# Patient Record
(demographics unavailable — no encounter records)

---

## 2024-10-30 NOTE — RESULTS/DATA
[FreeTextEntry1] : Follow-up PET/CT scan shows no evidence of disease:  7/18/23 PET/CT: IMPRESSION: 1. Persistent focal activity within the rectum that appears to correlate to a stable soft tissue density may represent activity in a polyp. Correlation with proctosigmoidoscopy is recommended.  2. New elongated subpleural opacity in the left lower lobe with low-grade activity may reflect an inflammatory process; reassess on follow-up.  3. Right upper lobe post radiation changes with similar nonfocal activity; other findings in the right middle lobe and in the lingula with nonspecific low grade activity likely reflective of nonspecific inflammatory process.   4/6/23 PET/CT IMPRESSION: 1. Relatively stable right upper lobe groundglass opacity with nonspecific activity and no distinct nodular component. Other lung findings remain stable without abnormal activity 2. Stable findings in the head and neck. 3. No new or suspicious FDG avid lesion in the remaining field-of-view.

## 2024-10-30 NOTE — HISTORY OF PRESENT ILLNESS
[de-identified] : Mr Montemayor is a 72 year old male with stage 4 adenoCa lung. . He completed SBRT 5400 cGy in 3 fractions on 2/18/2020. 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 The lung cancer treated with SBRT did not have a biopsy performed due to concerns regarding underlying COPD and tolerance of needle biopsy.  PET at that time: 1. Spiculated right upper lobe nodule considerably more solid compared to PET/CT of August 28, 2013 where it was a a groundglass nodular opacity, suspicious for malignancy  PET/CT 11/9/2020 - near complete resolution of FDG avid hepatic lesions. Tiny focus of FDG avidity in the right lobe in the location of a previously identified large necrotic new lesion probably represents a small area of residual disease -Resolved intensely FDG avid subcutaneous nodule right upper arm -Unchanged size and minimal FDG avidity od spiculated right upper lobe pulmonary nodule. Small focus sigmoid colon possibly inflamed diverticulum or polyp.  PET/CT 6/5/2020 -Multiple new FDG avid bilobar hepatic lesions, suspicious for metastatic disease -Intensely FDG avid right upper arm subcutaneous nodule, difficult to delineate, suspicious for metastasis. -Decreased size and FDG avidity of spiculated right upper lobe pulmonary nodule  Liver biopsy confirmed metastatic adenocarcinoma of pulmonary origin.     Following 3 cycles of Carbo/Alimta/Keytruda. PET/CT showed great response. We stopped Carboplatin and continued with Pemetrexed and Keytruda, as triple therapy had caused severe fatigue.. Patient still admited fatigue but it was slightly better since Carbo was stopped. Patient states appetite is okay. Patient c/o RLQ discomfort. PET/CT shows possible right inguinal hernia, unsure if pain is related. Denies fever/chills, nausea, vomiting, diarrhea,constipation, bleeding, easy bruising or visual changes, chest pain, SOB or ARGUELLES, LE edema.     Fatigue has largely resolved. We are now following on single agent Keytruda.       Interval History: PET/CT 1/26/21:  IMPRESSION: Compared to FDG-PET/CT scan 10/14/2020:  1. Resolution of tiny FDG avid focus in the right hepatic lobe.  2. Minimally FDG avid subcentimeter spiculated right upper lobe pulmonary nodule, unchanged.  3. Decreased intensity of FDG avid focus in the sigmoid colon.       PET/CT June 21, 2021:  IMPRESSION: Compared to FDG-PET/CT scan 1/26/2021:  1. Unchanged minimally FDG avid subcentimeter spiculated right upper lobe pulmonary nodule. 2. Unchanged, mildly FDG avid focus in the sigmoid colon. 3. Nonspecific new diffuse gastric hypermetabolism, possibly inflammatory. Please correlate clinically and with endoscopy, if indicated. 4. Mild FDG avidity associated with tracheostomy, nonspecific, probably physiologic. Please correlate clinically.  CT Chest 11/22/23:   IMPRESSION: Unchanged size of the previously radiated right apical nodule, although now with more rounded contours and increasing density. While findings may be secondary to postradiation evolution, recommend close interval follow-up to exclude the possibility of recurrent disease.  Nonspecific and unchanged nodular opacities in the medial right middle lobe and inferior lingula.  No new nodules. No adenopathy.       Interval History: Patient is tolerating treatment with Keytruda well with minimal side effects.  Reports stable ARGUELLES. Follows Dr. Chung. recently increased daily Prednisone to 10 mg. Patient does not notice much improvement. Patient declining home O2 Denies fever/chills, fatigue,rash, mouth sores, nausea, vomiting, diarrhea,constipation, abdominal pain, bleeding, easy bruising or visual changes, chest pain, cough, neuropathy, LE edema.   2/21/23 CT CAP: Right inguinal hernia  11/30/22 PET/CT IMPRESSION: Since prior FDG-PET/CT scan 6/21/2022: 1. Small RIGHT UPPER lobe nodule adjacent to groundglass is now indistinguishable from the revisualized groundglass changes in the RIGHT UPPER lobe. The lesion is otherwise unchanged in size. Remaining lung findings are unchanged. 2. Stable appearance of the head and neck. 3. No other suspicious findings noted.   [de-identified] : Continues on Keytruda Q3W. Next infusion due on 11/6/24. Patient tolerating medication well.  On home O2 prn for SOB, ARGUELLES, followed by Dr. Chung    Patient following with Cardiology and Pulmonology.  Today, he complains of confusion and changes in memory. He states that he frequently has had to write things down as reminders due to changes in memory.  Lately, he has been suffering from frequent anxiety/panic attacks and difficulty breathing. He expresses his concern for Keytruda as the potential cause.  CT Chest on 10/15/24: -No significant change in size and appearance of the radiated right apical nodule, as well as the nodular opacities in the inferior lingula and medial right middle lobe. -Newly occluded right middle lobe lobar bronchus, possibly due to mucoid impaction, can be reassessed on follow-up imaging. -Stable small hepatic hypodensities.

## 2024-10-30 NOTE — PHYSICAL EXAM
[Normal] : grossly intact [de-identified] : Status post total laryngectomy [de-identified] : No evidence of recurrence

## 2024-10-30 NOTE — REVIEW OF SYSTEMS
[Hoarseness] : hoarseness [Negative] : Heme/Lymph [FreeTextEntry2] : panic attacks  [FreeTextEntry4] : Trach

## 2024-10-30 NOTE — PHYSICAL EXAM
[Normal] : grossly intact [de-identified] : Status post total laryngectomy [de-identified] : No evidence of recurrence

## 2024-10-30 NOTE — ASSESSMENT
[FreeTextEntry1] : 72-year-old male with adenocarcinoma lung metastatic to liver, with an excellent response to carboplatin/pemetrexed/Keytruda. Now doing well on single agent immunotherapy utilizing Keytruda. Currently DAFNE.  Plan - Continue immunotherapy with Keytruda as a single agent. Next infusion due today, 9/25/24.  -We discussed circulating tumor DNA determination prior to potentially holding Pembro and monitoring off therapy. He would like to think about these options and let us know his decision.  - F/U with Dr. Chung, on home O2, following unchanged size of the previously radiated right apical nodule, although now with more rounded contours and increasing density.  - Refer to Vascular Surgeon for left LE edema. No DVT noted on recent Duplex.  - Reassured patient but we will schedule a Brain MRI and PET scan to rule out recurrence of disease and further evaluate patient's complaints of changes in confusion and memory. - RTO as scheduled.

## 2024-10-30 NOTE — ADDENDUM
[FreeTextEntry1] : Documented by Jin Silva acting as scribe for Dr. Willson on  10/30/2024.   All Medical record entries made by the Scribe were at my, Dr. Willson's, direction and personally dictated by me on  10/30/2024. I have reviewed the chart and agree that the record accurately reflects my personal performance of the history, physical exam, assessment and plan. I have also personally directed, reviewed, and agreed with the discharge instructions.

## 2024-10-30 NOTE — HISTORY OF PRESENT ILLNESS
[de-identified] : Mr Montemayor is a 72 year old male with stage 4 adenoCa lung. . He completed SBRT 5400 cGy in 3 fractions on 2/18/2020. 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 The lung cancer treated with SBRT did not have a biopsy performed due to concerns regarding underlying COPD and tolerance of needle biopsy.  PET at that time: 1. Spiculated right upper lobe nodule considerably more solid compared to PET/CT of August 28, 2013 where it was a a groundglass nodular opacity, suspicious for malignancy  PET/CT 11/9/2020 - near complete resolution of FDG avid hepatic lesions. Tiny focus of FDG avidity in the right lobe in the location of a previously identified large necrotic new lesion probably represents a small area of residual disease -Resolved intensely FDG avid subcutaneous nodule right upper arm -Unchanged size and minimal FDG avidity od spiculated right upper lobe pulmonary nodule. Small focus sigmoid colon possibly inflamed diverticulum or polyp.  PET/CT 6/5/2020 -Multiple new FDG avid bilobar hepatic lesions, suspicious for metastatic disease -Intensely FDG avid right upper arm subcutaneous nodule, difficult to delineate, suspicious for metastasis. -Decreased size and FDG avidity of spiculated right upper lobe pulmonary nodule  Liver biopsy confirmed metastatic adenocarcinoma of pulmonary origin.     Following 3 cycles of Carbo/Alimta/Keytruda. PET/CT showed great response. We stopped Carboplatin and continued with Pemetrexed and Keytruda, as triple therapy had caused severe fatigue.. Patient still admited fatigue but it was slightly better since Carbo was stopped. Patient states appetite is okay. Patient c/o RLQ discomfort. PET/CT shows possible right inguinal hernia, unsure if pain is related. Denies fever/chills, nausea, vomiting, diarrhea,constipation, bleeding, easy bruising or visual changes, chest pain, SOB or ARGUELLES, LE edema.     Fatigue has largely resolved. We are now following on single agent Keytruda.       Interval History: PET/CT 1/26/21:  IMPRESSION: Compared to FDG-PET/CT scan 10/14/2020:  1. Resolution of tiny FDG avid focus in the right hepatic lobe.  2. Minimally FDG avid subcentimeter spiculated right upper lobe pulmonary nodule, unchanged.  3. Decreased intensity of FDG avid focus in the sigmoid colon.       PET/CT June 21, 2021:  IMPRESSION: Compared to FDG-PET/CT scan 1/26/2021:  1. Unchanged minimally FDG avid subcentimeter spiculated right upper lobe pulmonary nodule. 2. Unchanged, mildly FDG avid focus in the sigmoid colon. 3. Nonspecific new diffuse gastric hypermetabolism, possibly inflammatory. Please correlate clinically and with endoscopy, if indicated. 4. Mild FDG avidity associated with tracheostomy, nonspecific, probably physiologic. Please correlate clinically.  CT Chest 11/22/23:   IMPRESSION: Unchanged size of the previously radiated right apical nodule, although now with more rounded contours and increasing density. While findings may be secondary to postradiation evolution, recommend close interval follow-up to exclude the possibility of recurrent disease.  Nonspecific and unchanged nodular opacities in the medial right middle lobe and inferior lingula.  No new nodules. No adenopathy.       Interval History: Patient is tolerating treatment with Keytruda well with minimal side effects.  Reports stable ARGUELLES. Follows Dr. Chung. recently increased daily Prednisone to 10 mg. Patient does not notice much improvement. Patient declining home O2 Denies fever/chills, fatigue,rash, mouth sores, nausea, vomiting, diarrhea,constipation, abdominal pain, bleeding, easy bruising or visual changes, chest pain, cough, neuropathy, LE edema.   2/21/23 CT CAP: Right inguinal hernia  11/30/22 PET/CT IMPRESSION: Since prior FDG-PET/CT scan 6/21/2022: 1. Small RIGHT UPPER lobe nodule adjacent to groundglass is now indistinguishable from the revisualized groundglass changes in the RIGHT UPPER lobe. The lesion is otherwise unchanged in size. Remaining lung findings are unchanged. 2. Stable appearance of the head and neck. 3. No other suspicious findings noted.   [de-identified] : Continues on Keytruda Q3W. Next infusion due on 11/6/24. Patient tolerating medication well.  On home O2 prn for SOB, ARGUELLES, followed by Dr. Chung    Patient following with Cardiology and Pulmonology.  Today, he complains of confusion and changes in memory. He states that he frequently has had to write things down as reminders due to changes in memory.  Lately, he has been suffering from frequent anxiety/panic attacks and difficulty breathing. He expresses his concern for Keytruda as the potential cause.  CT Chest on 10/15/24: -No significant change in size and appearance of the radiated right apical nodule, as well as the nodular opacities in the inferior lingula and medial right middle lobe. -Newly occluded right middle lobe lobar bronchus, possibly due to mucoid impaction, can be reassessed on follow-up imaging. -Stable small hepatic hypodensities.

## 2024-11-27 NOTE — HISTORY OF PRESENT ILLNESS
[de-identified] : Mr Montemayor is a 73 year old male with stage 4 adenoCa lung. . He completed SBRT 5400 cGy in 3 fractions on 2/18/2020. 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 The lung cancer treated with SBRT did not have a biopsy performed due to concerns regarding underlying COPD and tolerance of needle biopsy.  PET at that time: 1. Spiculated right upper lobe nodule considerably more solid compared to PET/CT of August 28, 2013 where it was a a groundglass nodular opacity, suspicious for malignancy  PET/CT 11/9/2020 - near complete resolution of FDG avid hepatic lesions. Tiny focus of FDG avidity in the right lobe in the location of a previously identified large necrotic new lesion probably represents a small area of residual disease -Resolved intensely FDG avid subcutaneous nodule right upper arm -Unchanged size and minimal FDG avidity od spiculated right upper lobe pulmonary nodule. Small focus sigmoid colon possibly inflamed diverticulum or polyp.  PET/CT 6/5/2020 -Multiple new FDG avid bilobar hepatic lesions, suspicious for metastatic disease -Intensely FDG avid right upper arm subcutaneous nodule, difficult to delineate, suspicious for metastasis. -Decreased size and FDG avidity of spiculated right upper lobe pulmonary nodule  Liver biopsy confirmed metastatic adenocarcinoma of pulmonary origin.     Following 3 cycles of Carbo/Alimta/Keytruda. PET/CT showed great response. We stopped Carboplatin and continued with Pemetrexed and Keytruda, as triple therapy had caused severe fatigue.. Patient still admited fatigue but it was slightly better since Carbo was stopped. Patient states appetite is okay. Patient c/o RLQ discomfort. PET/CT shows possible right inguinal hernia, unsure if pain is related. Denies fever/chills, nausea, vomiting, diarrhea,constipation, bleeding, easy bruising or visual changes, chest pain, SOB or ARGUELLES, LE edema.     Fatigue has largely resolved. We are now following on single agent Keytruda.       Interval History: PET/CT 1/26/21:  IMPRESSION: Compared to FDG-PET/CT scan 10/14/2020:  1. Resolution of tiny FDG avid focus in the right hepatic lobe.  2. Minimally FDG avid subcentimeter spiculated right upper lobe pulmonary nodule, unchanged.  3. Decreased intensity of FDG avid focus in the sigmoid colon.       PET/CT June 21, 2021:  IMPRESSION: Compared to FDG-PET/CT scan 1/26/2021:  1. Unchanged minimally FDG avid subcentimeter spiculated right upper lobe pulmonary nodule. 2. Unchanged, mildly FDG avid focus in the sigmoid colon. 3. Nonspecific new diffuse gastric hypermetabolism, possibly inflammatory. Please correlate clinically and with endoscopy, if indicated. 4. Mild FDG avidity associated with tracheostomy, nonspecific, probably physiologic. Please correlate clinically.  CT Chest 11/22/23:   IMPRESSION: Unchanged size of the previously radiated right apical nodule, although now with more rounded contours and increasing density. While findings may be secondary to postradiation evolution, recommend close interval follow-up to exclude the possibility of recurrent disease.  Nonspecific and unchanged nodular opacities in the medial right middle lobe and inferior lingula.  No new nodules. No adenopathy.       Interval History: Patient is tolerating treatment with Keytruda well with minimal side effects.  Reports stable ARGUELLES. Follows Dr. Chung. recently increased daily Prednisone to 10 mg. Patient does not notice much improvement. Patient declining home O2 Denies fever/chills, fatigue,rash, mouth sores, nausea, vomiting, diarrhea,constipation, abdominal pain, bleeding, easy bruising or visual changes, chest pain, cough, neuropathy, LE edema.   2/21/23 CT CAP: Right inguinal hernia  11/30/22 PET/CT IMPRESSION: Since prior FDG-PET/CT scan 6/21/2022: 1. Small RIGHT UPPER lobe nodule adjacent to groundglass is now indistinguishable from the revisualized groundglass changes in the RIGHT UPPER lobe. The lesion is otherwise unchanged in size. Remaining lung findings are unchanged. 2. Stable appearance of the head and neck. 3. No other suspicious findings noted.   [de-identified] : Continues on Keytruda Q3W. Received Keytruda prior to office visit. Patient tolerating medication well. Next infusion due on 12/18/24. On home O2 prn for SOB, ARGUELLES, followed by Dr. Chung    Patient following with Cardiology and Pulmonology.  Today, he offers no complaints.   11/19/24 MRI Head: No acute infarct, hemorrhage, or mass effect. No evidence of metastasis.  11/12/24 PET: 1. Stable nonavid right upper lobe nodule and lingular opacity. No new concerning lung nodule/opacity. 2. Persistent intense focal activity within the rectum without interval low-dose CT changes.

## 2024-11-27 NOTE — ADDENDUM
[FreeTextEntry1] : Documented by Jin Silva acting as scribe for Dr. Willson on  11/27/2024.   All Medical record entries made by the Scribe were at my, Dr. Willson's, direction and personally dictated by me on 11/27/2024. I have reviewed the chart and agree that the record accurately reflects my personal performance of the history, physical exam, assessment and plan. I have also personally directed, reviewed, and agreed with the discharge instructions.

## 2024-11-27 NOTE — ASSESSMENT
[FreeTextEntry1] : 72-year-old male with adenocarcinoma lung metastatic to liver, with an excellent response to carboplatin/pemetrexed/Keytruda. Now doing well on single agent immunotherapy utilizing Keytruda. Currently DAFNE.  Plan - Continue immunotherapy with Keytruda as a single agent. Next infusion due today, 9/25/24.  - We discussed circulating tumor DNA determination prior to potentially holding Pembro and monitoring off therapy. He would like to think about these options and let us know his decision.

## 2024-11-27 NOTE — HISTORY OF PRESENT ILLNESS
[de-identified] : Mr Montemayor is a 73 year old male with stage 4 adenoCa lung. . He completed SBRT 5400 cGy in 3 fractions on 2/18/2020. 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 The lung cancer treated with SBRT did not have a biopsy performed due to concerns regarding underlying COPD and tolerance of needle biopsy.  PET at that time: 1. Spiculated right upper lobe nodule considerably more solid compared to PET/CT of August 28, 2013 where it was a a groundglass nodular opacity, suspicious for malignancy  PET/CT 11/9/2020 - near complete resolution of FDG avid hepatic lesions. Tiny focus of FDG avidity in the right lobe in the location of a previously identified large necrotic new lesion probably represents a small area of residual disease -Resolved intensely FDG avid subcutaneous nodule right upper arm -Unchanged size and minimal FDG avidity od spiculated right upper lobe pulmonary nodule. Small focus sigmoid colon possibly inflamed diverticulum or polyp.  PET/CT 6/5/2020 -Multiple new FDG avid bilobar hepatic lesions, suspicious for metastatic disease -Intensely FDG avid right upper arm subcutaneous nodule, difficult to delineate, suspicious for metastasis. -Decreased size and FDG avidity of spiculated right upper lobe pulmonary nodule  Liver biopsy confirmed metastatic adenocarcinoma of pulmonary origin.     Following 3 cycles of Carbo/Alimta/Keytruda. PET/CT showed great response. We stopped Carboplatin and continued with Pemetrexed and Keytruda, as triple therapy had caused severe fatigue.. Patient still admited fatigue but it was slightly better since Carbo was stopped. Patient states appetite is okay. Patient c/o RLQ discomfort. PET/CT shows possible right inguinal hernia, unsure if pain is related. Denies fever/chills, nausea, vomiting, diarrhea,constipation, bleeding, easy bruising or visual changes, chest pain, SOB or ARGUELLES, LE edema.     Fatigue has largely resolved. We are now following on single agent Keytruda.       Interval History: PET/CT 1/26/21:  IMPRESSION: Compared to FDG-PET/CT scan 10/14/2020:  1. Resolution of tiny FDG avid focus in the right hepatic lobe.  2. Minimally FDG avid subcentimeter spiculated right upper lobe pulmonary nodule, unchanged.  3. Decreased intensity of FDG avid focus in the sigmoid colon.       PET/CT June 21, 2021:  IMPRESSION: Compared to FDG-PET/CT scan 1/26/2021:  1. Unchanged minimally FDG avid subcentimeter spiculated right upper lobe pulmonary nodule. 2. Unchanged, mildly FDG avid focus in the sigmoid colon. 3. Nonspecific new diffuse gastric hypermetabolism, possibly inflammatory. Please correlate clinically and with endoscopy, if indicated. 4. Mild FDG avidity associated with tracheostomy, nonspecific, probably physiologic. Please correlate clinically.  CT Chest 11/22/23:   IMPRESSION: Unchanged size of the previously radiated right apical nodule, although now with more rounded contours and increasing density. While findings may be secondary to postradiation evolution, recommend close interval follow-up to exclude the possibility of recurrent disease.  Nonspecific and unchanged nodular opacities in the medial right middle lobe and inferior lingula.  No new nodules. No adenopathy.       Interval History: Patient is tolerating treatment with Keytruda well with minimal side effects.  Reports stable ARGUELLES. Follows Dr. Chung. recently increased daily Prednisone to 10 mg. Patient does not notice much improvement. Patient declining home O2 Denies fever/chills, fatigue,rash, mouth sores, nausea, vomiting, diarrhea,constipation, abdominal pain, bleeding, easy bruising or visual changes, chest pain, cough, neuropathy, LE edema.   2/21/23 CT CAP: Right inguinal hernia  11/30/22 PET/CT IMPRESSION: Since prior FDG-PET/CT scan 6/21/2022: 1. Small RIGHT UPPER lobe nodule adjacent to groundglass is now indistinguishable from the revisualized groundglass changes in the RIGHT UPPER lobe. The lesion is otherwise unchanged in size. Remaining lung findings are unchanged. 2. Stable appearance of the head and neck. 3. No other suspicious findings noted.   [de-identified] : Continues on Keytruda Q3W. Received Keytruda prior to office visit. Patient tolerating medication well. Next infusion due on 12/18/24. On home O2 prn for SOB, ARGUELLES, followed by Dr. Chung    Patient following with Cardiology and Pulmonology.  Today, he offers no complaints.   11/19/24 MRI Head: No acute infarct, hemorrhage, or mass effect. No evidence of metastasis.  11/12/24 PET: 1. Stable nonavid right upper lobe nodule and lingular opacity. No new concerning lung nodule/opacity. 2. Persistent intense focal activity within the rectum without interval low-dose CT changes.

## 2024-11-27 NOTE — PHYSICAL EXAM
[Normal] : grossly intact [de-identified] : Status post total laryngectomy [de-identified] : No evidence of recurrence

## 2024-11-27 NOTE — PHYSICAL EXAM
[Normal] : grossly intact [de-identified] : Status post total laryngectomy [de-identified] : No evidence of recurrence

## 2024-12-30 NOTE — ADDENDUM
[FreeTextEntry1] : No pulmonary contraindications to cataract surgery Increased risk of post operative pulmonary complications risk/benefit favors keeps p02 > 90%, on home 02 duo neb q 4-6 hr prn

## 2024-12-30 NOTE — PHYSICAL EXAM
[General Appearance - Well Developed] : well developed [Normal Appearance] : normal appearance [Well Groomed] : well groomed [General Appearance - Well Nourished] : well nourished [No Deformities] : no deformities [Normal Conjunctiva] : the conjunctiva exhibited no abnormalities [Heart Rate And Rhythm] : heart rate and rhythm were normal [Heart Sounds] : normal S1 and S2 [Murmurs] : no murmurs present [Respiration, Rhythm And Depth] : normal respiratory rhythm and effort [Exaggerated Use Of Accessory Muscles For Inspiration] : no accessory muscle use [Abnormal Walk] : normal gait [] : no rash [No Focal Deficits] : no focal deficits [Oriented To Time, Place, And Person] : oriented to person, place, and time [Impaired Insight] : insight and judgment were intact [Affect] : the affect was normal [Memory Recent] : recent memory was not impaired [Memory Remote] : remote memory was not impaired [Nail Clubbing] : no clubbing of the fingernails [Cyanosis, Localized] : no localized cyanosis [FreeTextEntry1] : no edema Stable

## 2024-12-30 NOTE — CONSULT LETTER
[Dear  ___] : Dear  [unfilled], [Consult Letter:] : I had the pleasure of evaluating your patient, [unfilled]. [Please see my note below.] : Please see my note below. [Consult Closing:] : Thank you very much for allowing me to participate in the care of this patient.  If you have any questions, please do not hesitate to contact me. [DrClaudia  ___] : Dr. LOJA [DrClaudia ___] : Dr. LOJA [[facility], [facility]] : Maximo Chung DO, [facility] [Director, Respiratory Care] : Director, Respiratory Care [[facility]] : [facility] [FreeTextEntry3] : [facility]\par  Pulmonary Critical Care\par  Director Pulmonary Division\par  Medical Director Respiratory Therapy\par  [facility]\par  \par

## 2024-12-30 NOTE — HISTORY OF PRESENT ILLNESS
[Former] : former [>= 20 pack years] : >= 20 pack years [FreeTextEntry1] : no fever, chills, chest pain chronic exertional dyspnea at baseline Stiolto daily, helps Dyspnea chronic no benefit of nebulizer still working, considering intermediate using 02 intermittently Remains on Pembro q 3 weeks, Oncology input noted Recent PET scan Dr Willson    [YearQuit] : 2013

## 2024-12-30 NOTE — DISCUSSION/SUMMARY
[FreeTextEntry1] : Severe COPD class IIID ,Distant  glottis RT with salvage laryngectomy for squamous cell Ca with  tracheostomy stoma/tube-post  SBRT 5400 cGy, 2/20 RUL ,  Remains on Keytruda CT scan 2/24 stable- , CT/PET 11/24 DAFNE, rectal uptake noted has GI appt Currently at baseline, chronic dyspnea and tracheal secretions Continue  Stiolto daily, no benefit of nebs, trial of extra dose pm, ( notes benefit but states it wains) Azithromycin TIW to decrease secretions, exacerbations- is helping Cardiology following Prior PE w/u negative Discussed pulmonary Rehab, he does not want  Compliance with 02 stressed, nocturnal and ex 3 months  or sooner if needed, with CT

## 2025-01-21 NOTE — PHYSICAL EXAM
[Alert] : alert [Normal Voice/Communication] : normal voice/communication [Healthy Appearing] : healthy appearing [No Acute Distress] : no acute distress [Well Developed] : well developed [Well Nourished] : well nourished [Sclera] : the sclera and conjunctiva were normal [Hearing Threshold Finger Rub Not [place]] : hearing was normal [Normal Lips/Gums] : the lips and gums were normal [Oropharynx] : the oropharynx was normal [Normal Appearance] : the appearance of the neck was normal [No Neck Mass] : no neck mass was observed [No Respiratory Distress] : no respiratory distress [No Acc Muscle Use] : no accessory muscle use [Respiration, Rhythm And Depth] : normal respiratory rhythm and effort [Auscultation Breath Sounds / Voice Sounds] : lungs were clear to auscultation bilaterally [Heart Rate And Rhythm] : heart rate was normal and rhythm regular [Normal S1, S2] : normal S1 and S2 [Murmurs] : no murmurs [Bowel Sounds] : normal bowel sounds [Abdomen Tenderness] : non-tender [No Masses] : no abdominal mass palpated [Abdomen Soft] : soft [] : no hepatosplenomegaly [No CVA Tenderness] : no CVA  tenderness [Abnormal Walk] : normal gait [No Joint Swelling] : no joint swelling seen [Normal Color / Pigmentation] : normal skin color and pigmentation [Oriented To Time, Place, And Person] : oriented to person, place, and time [de-identified] : Patient has chronic tracheostomy [de-identified] : Deferred until colonoscopy.

## 2025-01-21 NOTE — ASSESSMENT
[FreeTextEntry1] : Impression: Abnormal PET CT scan of the colon showing area of increased activity in the rectum rule out possible rectal neoplasm.  Patient has a personal history of metastatic adenocarcinoma of the lung with metastasis to the liver as well as a separate primary laryngeal cancer status post total laryngectomy and radiation therapy for this cancer.  Patient has had no previous colonoscopies.  Recommendations: Diagnostic colonoscopy was advised for further evaluation of the abnormal PET CT scan of the colon.  The risk versus benefits of colonoscopy and intravenous sedation, and alternative testing such as virtual colonoscopy, were individually explained to the patient today who appeared to understand all of the above and was agreeable to proceeding with colonoscopy..  Preprocedure cardiac clearance was requested.  His ASA classification is 3 and pending pulmonary clearance he will be medically optimized for the proposed colonoscopy.  Reviewed recent lab work done in early January of this year shows no significant abnormalities.  He appeared to understand all of the above instructions, information, and management plan.  I reviewed his entire workup and record including PET CT scan from November 2024 as well as his lab work from January 2025 as well as his notes from his oncologist and pulmonologist as part of this office evaluation.
Normal rate, regular rhythm.  Heart sounds S1, S2.  No murmurs, rubs or gallops.

## 2025-02-20 NOTE — PHYSICAL EXAM
[No Acute Distress] : no acute distress [Well Nourished] : well nourished [Well Developed] : well developed [Well-Appearing] : well-appearing [Normal Sclera/Conjunctiva] : normal sclera/conjunctiva [PERRL] : pupils equal round and reactive to light [EOMI] : extraocular movements intact [Normal Outer Ear/Nose] : the outer ears and nose were normal in appearance [Normal Oropharynx] : the oropharynx was normal [No Lymphadenopathy] : no lymphadenopathy [Supple] : supple [Thyroid Normal, No Nodules] : the thyroid was normal and there were no nodules present [No Respiratory Distress] : no respiratory distress  [No Accessory Muscle Use] : no accessory muscle use [Clear to Auscultation] : lungs were clear to auscultation bilaterally [Normal Rate] : normal rate  [Regular Rhythm] : with a regular rhythm [Normal S1, S2] : normal S1 and S2 [No Murmur] : no murmur heard [No Carotid Bruits] : no carotid bruits [No Abdominal Bruit] : a ~M bruit was not heard ~T in the abdomen [No Varicosities] : no varicosities [Pedal Pulses Present] : the pedal pulses are present [No Edema] : there was no peripheral edema [No Palpable Aorta] : no palpable aorta [No Extremity Clubbing/Cyanosis] : no extremity clubbing/cyanosis [Soft] : abdomen soft [Non-distended] : non-distended [No Masses] : no abdominal mass palpated [No HSM] : no HSM [Normal Bowel Sounds] : normal bowel sounds [Normal Posterior Cervical Nodes] : no posterior cervical lymphadenopathy [Normal Anterior Cervical Nodes] : no anterior cervical lymphadenopathy [No CVA Tenderness] : no CVA  tenderness [No Spinal Tenderness] : no spinal tenderness [No Joint Swelling] : no joint swelling [Grossly Normal Strength/Tone] : grossly normal strength/tone [No Rash] : no rash [Coordination Grossly Intact] : coordination grossly intact [No Focal Deficits] : no focal deficits [Normal Gait] : normal gait [Deep Tendon Reflexes (DTR)] : deep tendon reflexes were 2+ and symmetric [Normal Affect] : the affect was normal [Normal Insight/Judgement] : insight and judgment were intact

## 2025-02-20 NOTE — HEALTH RISK ASSESSMENT
[Good] : ~his/her~  mood as  good [No] : No [1 or 2 (0 pts)] : 1 or 2 (0 points) [Never (0 pts)] : Never (0 points) [No falls in past year] : Patient reported no falls in the past year [1] : 2) Feeling down, depressed, or hopeless for several days (1) [Former] : Former [20 or more] : 20 or more [< 15 Years] : < 15 Years [NO] : No [With Significant Other] : lives with significant other [] :  [Feels Safe at Home] : Feels safe at home

## 2025-02-25 NOTE — ADDENDUM
[FreeTextEntry1] : Documented by Jin Silva acting as scribe for Dr. Willson on  02/19/2025.   All Medical record entries made by the Scribe were at my, Dr. Willson's, direction and personally dictated by me on 02/19/2025. I have reviewed the chart and agree that the record accurately reflects my personal performance of the history, physical exam, assessment and plan. I have also personally directed, reviewed, and agreed with the discharge instructions.

## 2025-02-25 NOTE — PHYSICAL EXAM
[Normal] : grossly intact [de-identified] : Status post total laryngectomy [de-identified] : No evidence of recurrence

## 2025-02-25 NOTE — HISTORY OF PRESENT ILLNESS
[de-identified] : Mr Montemayor is a 73 year old male with stage 4 adenoCa lung. . He completed SBRT 5400 cGy in 3 fractions on 2/18/2020. 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 The lung cancer treated with SBRT did not have a biopsy performed due to concerns regarding underlying COPD and tolerance of needle biopsy.  PET at that time: 1. Spiculated right upper lobe nodule considerably more solid compared to PET/CT of August 28, 2013 where it was a a groundglass nodular opacity, suspicious for malignancy  PET/CT 11/9/2020 - near complete resolution of FDG avid hepatic lesions. Tiny focus of FDG avidity in the right lobe in the location of a previously identified large necrotic new lesion probably represents a small area of residual disease -Resolved intensely FDG avid subcutaneous nodule right upper arm -Unchanged size and minimal FDG avidity od spiculated right upper lobe pulmonary nodule. Small focus sigmoid colon possibly inflamed diverticulum or polyp.  PET/CT 6/5/2020 -Multiple new FDG avid bilobar hepatic lesions, suspicious for metastatic disease -Intensely FDG avid right upper arm subcutaneous nodule, difficult to delineate, suspicious for metastasis. -Decreased size and FDG avidity of spiculated right upper lobe pulmonary nodule  Liver biopsy confirmed metastatic adenocarcinoma of pulmonary origin.     Following 3 cycles of Carbo/Alimta/Keytruda. PET/CT showed great response. We stopped Carboplatin and continued with Pemetrexed and Keytruda, as triple therapy had caused severe fatigue.. Patient still admited fatigue but it was slightly better since Carbo was stopped. Patient states appetite is okay. Patient c/o RLQ discomfort. PET/CT shows possible right inguinal hernia, unsure if pain is related. Denies fever/chills, nausea, vomiting, diarrhea,constipation, bleeding, easy bruising or visual changes, chest pain, SOB or ARGUELLES, LE edema.     Fatigue has largely resolved. We are now following on single agent Keytruda.       Interval History: PET/CT 1/26/21:  IMPRESSION: Compared to FDG-PET/CT scan 10/14/2020:  1. Resolution of tiny FDG avid focus in the right hepatic lobe.  2. Minimally FDG avid subcentimeter spiculated right upper lobe pulmonary nodule, unchanged.  3. Decreased intensity of FDG avid focus in the sigmoid colon.       PET/CT June 21, 2021:  IMPRESSION: Compared to FDG-PET/CT scan 1/26/2021:  1. Unchanged minimally FDG avid subcentimeter spiculated right upper lobe pulmonary nodule. 2. Unchanged, mildly FDG avid focus in the sigmoid colon. 3. Nonspecific new diffuse gastric hypermetabolism, possibly inflammatory. Please correlate clinically and with endoscopy, if indicated. 4. Mild FDG avidity associated with tracheostomy, nonspecific, probably physiologic. Please correlate clinically.  CT Chest 11/22/23:   IMPRESSION: Unchanged size of the previously radiated right apical nodule, although now with more rounded contours and increasing density. While findings may be secondary to postradiation evolution, recommend close interval follow-up to exclude the possibility of recurrent disease.  Nonspecific and unchanged nodular opacities in the medial right middle lobe and inferior lingula.  No new nodules. No adenopathy.       Interval History: Patient is tolerating treatment with Keytruda well with minimal side effects.  Reports stable ARGUELLES. Follows Dr. Chung. recently increased daily Prednisone to 10 mg. Patient does not notice much improvement. Patient declining home O2 Denies fever/chills, fatigue,rash, mouth sores, nausea, vomiting, diarrhea,constipation, abdominal pain, bleeding, easy bruising or visual changes, chest pain, cough, neuropathy, LE edema.   2/21/23 CT CAP: Right inguinal hernia  11/30/22 PET/CT IMPRESSION: Since prior FDG-PET/CT scan 6/21/2022: 1. Small RIGHT UPPER lobe nodule adjacent to groundglass is now indistinguishable from the revisualized groundglass changes in the RIGHT UPPER lobe. The lesion is otherwise unchanged in size. Remaining lung findings are unchanged. 2. Stable appearance of the head and neck. 3. No other suspicious findings noted.   [de-identified] : Continues on Keytruda Q3W. Due for Keytruda today, 2/19/25. Patient continues to tolerate the medication well.  On home O2 prn for SOB, ARGUELLES, followed by Dr. Chung    Patient following with Cardiology and Pulmonology.  Patient complains of decreased appetite and abdominal pain. He endorses some swelling to the left lateral neck as well.  Juanito has seen his GI team for his symptoms and for increased rectal activity on most recent PET on 11/12/24. He now has a colonoscopy sched for May 2025.   11/19/24 MRI Head: No acute infarct, hemorrhage, or mass effect. No evidence of metastasis.  11/12/24 PET: 1. Stable nonavid right upper lobe nodule and lingular opacity. No new concerning lung nodule/opacity. 2. Persistent intense focal activity within the rectum without interval low-dose CT changes.

## 2025-02-25 NOTE — ASSESSMENT
[FreeTextEntry1] : 72-year-old male with adenocarcinoma lung metastatic to liver, with an excellent response to carboplatin/pemetrexed/Keytruda. Now doing well on single agent immunotherapy utilizing Keytruda. Currently DAFNE.  Plan - Continue immunotherapy with Keytruda as a single agent. Next infusion due, 3/12/25  - We discussed circulating tumor DNA determination prior to potentially holding Pembro and monitoring off therapy. He would like to think about these options and let us know his decision.  - Colonoscopy in May 2025 -  Repeat CT neck and abdomen and RTO to discuss report

## 2025-02-25 NOTE — HISTORY OF PRESENT ILLNESS
[de-identified] : Mr Montemayor is a 73 year old male with stage 4 adenoCa lung. . He completed SBRT 5400 cGy in 3 fractions on 2/18/2020. 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 The lung cancer treated with SBRT did not have a biopsy performed due to concerns regarding underlying COPD and tolerance of needle biopsy.  PET at that time: 1. Spiculated right upper lobe nodule considerably more solid compared to PET/CT of August 28, 2013 where it was a a groundglass nodular opacity, suspicious for malignancy  PET/CT 11/9/2020 - near complete resolution of FDG avid hepatic lesions. Tiny focus of FDG avidity in the right lobe in the location of a previously identified large necrotic new lesion probably represents a small area of residual disease -Resolved intensely FDG avid subcutaneous nodule right upper arm -Unchanged size and minimal FDG avidity od spiculated right upper lobe pulmonary nodule. Small focus sigmoid colon possibly inflamed diverticulum or polyp.  PET/CT 6/5/2020 -Multiple new FDG avid bilobar hepatic lesions, suspicious for metastatic disease -Intensely FDG avid right upper arm subcutaneous nodule, difficult to delineate, suspicious for metastasis. -Decreased size and FDG avidity of spiculated right upper lobe pulmonary nodule  Liver biopsy confirmed metastatic adenocarcinoma of pulmonary origin.     Following 3 cycles of Carbo/Alimta/Keytruda. PET/CT showed great response. We stopped Carboplatin and continued with Pemetrexed and Keytruda, as triple therapy had caused severe fatigue.. Patient still admited fatigue but it was slightly better since Carbo was stopped. Patient states appetite is okay. Patient c/o RLQ discomfort. PET/CT shows possible right inguinal hernia, unsure if pain is related. Denies fever/chills, nausea, vomiting, diarrhea,constipation, bleeding, easy bruising or visual changes, chest pain, SOB or ARGUELLES, LE edema.     Fatigue has largely resolved. We are now following on single agent Keytruda.       Interval History: PET/CT 1/26/21:  IMPRESSION: Compared to FDG-PET/CT scan 10/14/2020:  1. Resolution of tiny FDG avid focus in the right hepatic lobe.  2. Minimally FDG avid subcentimeter spiculated right upper lobe pulmonary nodule, unchanged.  3. Decreased intensity of FDG avid focus in the sigmoid colon.       PET/CT June 21, 2021:  IMPRESSION: Compared to FDG-PET/CT scan 1/26/2021:  1. Unchanged minimally FDG avid subcentimeter spiculated right upper lobe pulmonary nodule. 2. Unchanged, mildly FDG avid focus in the sigmoid colon. 3. Nonspecific new diffuse gastric hypermetabolism, possibly inflammatory. Please correlate clinically and with endoscopy, if indicated. 4. Mild FDG avidity associated with tracheostomy, nonspecific, probably physiologic. Please correlate clinically.  CT Chest 11/22/23:   IMPRESSION: Unchanged size of the previously radiated right apical nodule, although now with more rounded contours and increasing density. While findings may be secondary to postradiation evolution, recommend close interval follow-up to exclude the possibility of recurrent disease.  Nonspecific and unchanged nodular opacities in the medial right middle lobe and inferior lingula.  No new nodules. No adenopathy.       Interval History: Patient is tolerating treatment with Keytruda well with minimal side effects.  Reports stable ARGUELLES. Follows Dr. Chung. recently increased daily Prednisone to 10 mg. Patient does not notice much improvement. Patient declining home O2 Denies fever/chills, fatigue,rash, mouth sores, nausea, vomiting, diarrhea,constipation, abdominal pain, bleeding, easy bruising or visual changes, chest pain, cough, neuropathy, LE edema.   2/21/23 CT CAP: Right inguinal hernia  11/30/22 PET/CT IMPRESSION: Since prior FDG-PET/CT scan 6/21/2022: 1. Small RIGHT UPPER lobe nodule adjacent to groundglass is now indistinguishable from the revisualized groundglass changes in the RIGHT UPPER lobe. The lesion is otherwise unchanged in size. Remaining lung findings are unchanged. 2. Stable appearance of the head and neck. 3. No other suspicious findings noted.   [de-identified] : Continues on Keytruda Q3W. Due for Keytruda today, 2/19/25. Patient continues to tolerate the medication well.  On home O2 prn for SOB, ARGUELLES, followed by Dr. Chung    Patient following with Cardiology and Pulmonology.  Patient complains of decreased appetite and abdominal pain. He endorses some swelling to the left lateral neck as well.  Juanito has seen his GI team for his symptoms and for increased rectal activity on most recent PET on 11/12/24. He now has a colonoscopy sched for May 2025.   11/19/24 MRI Head: No acute infarct, hemorrhage, or mass effect. No evidence of metastasis.  11/12/24 PET: 1. Stable nonavid right upper lobe nodule and lingular opacity. No new concerning lung nodule/opacity. 2. Persistent intense focal activity within the rectum without interval low-dose CT changes.

## 2025-02-25 NOTE — PHYSICAL EXAM
[Normal] : grossly intact [de-identified] : Status post total laryngectomy [de-identified] : No evidence of recurrence

## 2025-02-25 NOTE — ADDENDUM
Objective     8/26/2021    Tyra Juarez DO    Chief Complaint   Patient presents with   • Follow-up     3 month   • Office Visit       Ms Mcguire returns for routine follow-up.  At her last office visit we increased her amlodipine and added a atorvastatin.  She is doing well with no chest complaints although she remains limited by back pains.  She did receive injection by orthopedic surgeon into the SI joint.  She is able to walk about a block but still babies her back.          Review of Systems   Constitutional: Negative for chills, malaise/fatigue, night sweats, weight gain and weight loss.   HENT: Negative for congestion, hoarse voice and nosebleeds.    Eyes: Negative for visual disturbance.   Cardiovascular: Negative for chest pain, claudication, dyspnea on exertion, leg swelling, orthopnea, palpitations and paroxysmal nocturnal dyspnea.   Respiratory: Negative for cough, hemoptysis, shortness of breath, sleep disturbances due to breathing and sputum production.    Endocrine: Negative for cold intolerance, heat intolerance, polydipsia and polyuria.   Hematologic/Lymphatic: Negative for adenopathy and bleeding problem. Does not bruise/bleed easily.   Skin: Negative for itching and rash.   Musculoskeletal: Positive for back pain. Negative for falls, joint pain, joint swelling, muscle cramps and muscle weakness.   Gastrointestinal: Negative for abdominal pain, change in bowel habit, hematemesis, hematochezia, melena and vomiting.   Genitourinary: Negative for dysuria, frequency, hematuria and nocturia.   Neurological: Negative for excessive daytime sleepiness, focal weakness, headaches, loss of balance, numbness and paresthesias.   Psychiatric/Behavioral: Negative for depression. The patient does not have insomnia and is not nervous/anxious.    Allergic/Immunologic: Negative.          ALLERGIES:  No Known Allergies    Current Medications:   Current Outpatient Medications   Medication   • gabapentin (NEURONTIN)  [FreeTextEntry1] : Documented by Jin Silva acting as scribe for Dr. Willson on  02/19/2025.   All Medical record entries made by the Scribe were at my, Dr. Willson's, direction and personally dictated by me on 02/19/2025. I have reviewed the chart and agree that the record accurately reflects my personal performance of the history, physical exam, assessment and plan. I have also personally directed, reviewed, and agreed with the discharge instructions.  100 MG capsule   • hydrochlorothiazide (HYDRODIURIL) 25 MG tablet   • moexipril (UNIVASC) 15 MG tablet   • amLODIPine (NORVASC) 10 MG tablet   • atorvastatin (LIPITOR) 20 MG tablet   • letrozole (FEMARA) 2.5 MG tablet   • metoPROLOL succinate (TOPROL-XL) 100 MG 24 hr tablet   • aspirin (ASPIRIN ADULT LOW DOSE) 81 MG EC tablet   • Gluc-Chonn-MSM-Boswellia-Vit D (GLUCOSAMINE CHONDROITIN COMPLX) Tab   • CALCIUM PO     No current facility-administered medications for this visit.       Social History:    Social History     Tobacco Use   Smoking Status Never Smoker   Smokeless Tobacco Never Used        reports current alcohol use.    Family History:  Family History   Problem Relation Age of Onset   • Cancer, Pancreatic Mother    • Hypertension Father    • Mesothelioma Father    • Hypertension Sister    • Hypertension Brother        Patient's medications, allergies, past medical, surgical, social and family histories were reviewed and updated as appropriate.    VITALS:   Blood pressure 124/56, pulse 60, height 5' 6\" (1.676 m), weight 61.1 kg (134 lb 11.2 oz).  Weight    08/26/21 1026   Weight: 61.1 kg (134 lb 11.2 oz)       Physical Exam   Constitutional: She appears healthy. No distress.   HENT:   Nose: No nasal discharge.   Eyes: Conjunctivae are normal.   Neck: Thyroid normal. No JVD present. No neck adenopathy. No thyromegaly present.   Cardiovascular: Regular rhythm, S1 normal, S2 normal and intact distal pulses.  Extrasystoles are present. PMI is not displaced. Exam reveals no gallop.   Murmur heard.   Medium-pitched holosystolic murmur is present with a grade of 2/6 at the upper right sternal border radiating to the neck.  Pulmonary/Chest: Effort normal and breath sounds normal. She has no wheezes. She has no rales. She exhibits no tenderness.   Abdominal: Soft. Bowel sounds are normal. She exhibits no distension and no mass. There is no hepatomegaly. There is no abdominal tenderness.   Musculoskeletal:          General: No edema.      Cervical back: Neck supple.   Neurological: She is oriented to person, place, and time. She has normal motor skills and intact cranial nerves. Gait normal.   Skin: Skin is warm and dry. No cyanosis. Nails show no clubbing.       Diagnostic data:    Component      Latest Ref Rng & Units 8/13/2021           8:37 AM   Fasting Status      Hours 10   CHOLESTEROL      <=199 mg/dL 191   HDL      >=50 mg/dL 95   TRIGLYCERIDE      <=149 mg/dL 90   CALCULATED LDL      <=129 mg/dL 78   CALCULATED NON HDL      mg/dL 96   CHOL/HDL      <=4.4 2.0       Assessment/Plan:        Assessment   Problem List Items Addressed This Visit        Cardiac and Vasculature    Aortic stenosis with bicuspid valve     4/14/2021 probable bicuspid aortic valve on TTE (also suggested in a previous TTE in 2018) very mild fibrocalcific changes and stenosis with a mean gradient of 12 mmHg, no AI, normal aortic root    Periodic follow-up discussed.  We will get follow-up echocardiogram next April and will see her in the office then after.    Monitor for any new symptoms or shortness of breath chest pain or faintness.  From a cardiac perspective she may go back to an aerobic exercise routine once released by her orthopedic surgeon         Premature atrial beats - Primary     Remains asymptomatic in this regard no significant arrhythmia noted on physical exam.  Continue metoprolol for hypertension.  Call with any new or progressing symptoms         Essential hypertension     Excellent response to increased amlodipine to 10 mg.  Home blood pressure log is reviewed and she is consistently in the 1 teens range.  Continue current regimen.  Chemistry normal except borderline hyponatremia noted.  Low-sodium diet discussed.         Pure hypercholesterolemia     10yr ACC CV risk is calculated at 19%  Atorvastatin 20 mg started 5/2021    She had excellent response to moderate dose statin.  LDL went from 1 53-78.  Chemistry normal.  Continue  current regimen and heart healthy diet.               Return in about 8 months (around 4/26/2022).    [facility] Gary Pierce MD, FACC

## 2025-02-25 NOTE — HISTORY OF PRESENT ILLNESS
[de-identified] : Mr Montemayor is a 73 year old male with stage 4 adenoCa lung. . He completed SBRT 5400 cGy in 3 fractions on 2/18/2020. 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 The lung cancer treated with SBRT did not have a biopsy performed due to concerns regarding underlying COPD and tolerance of needle biopsy.  PET at that time: 1. Spiculated right upper lobe nodule considerably more solid compared to PET/CT of August 28, 2013 where it was a a groundglass nodular opacity, suspicious for malignancy  PET/CT 11/9/2020 - near complete resolution of FDG avid hepatic lesions. Tiny focus of FDG avidity in the right lobe in the location of a previously identified large necrotic new lesion probably represents a small area of residual disease -Resolved intensely FDG avid subcutaneous nodule right upper arm -Unchanged size and minimal FDG avidity od spiculated right upper lobe pulmonary nodule. Small focus sigmoid colon possibly inflamed diverticulum or polyp.  PET/CT 6/5/2020 -Multiple new FDG avid bilobar hepatic lesions, suspicious for metastatic disease -Intensely FDG avid right upper arm subcutaneous nodule, difficult to delineate, suspicious for metastasis. -Decreased size and FDG avidity of spiculated right upper lobe pulmonary nodule  Liver biopsy confirmed metastatic adenocarcinoma of pulmonary origin.     Following 3 cycles of Carbo/Alimta/Keytruda. PET/CT showed great response. We stopped Carboplatin and continued with Pemetrexed and Keytruda, as triple therapy had caused severe fatigue.. Patient still admited fatigue but it was slightly better since Carbo was stopped. Patient states appetite is okay. Patient c/o RLQ discomfort. PET/CT shows possible right inguinal hernia, unsure if pain is related. Denies fever/chills, nausea, vomiting, diarrhea,constipation, bleeding, easy bruising or visual changes, chest pain, SOB or ARGUELLES, LE edema.     Fatigue has largely resolved. We are now following on single agent Keytruda.       Interval History: PET/CT 1/26/21:  IMPRESSION: Compared to FDG-PET/CT scan 10/14/2020:  1. Resolution of tiny FDG avid focus in the right hepatic lobe.  2. Minimally FDG avid subcentimeter spiculated right upper lobe pulmonary nodule, unchanged.  3. Decreased intensity of FDG avid focus in the sigmoid colon.       PET/CT June 21, 2021:  IMPRESSION: Compared to FDG-PET/CT scan 1/26/2021:  1. Unchanged minimally FDG avid subcentimeter spiculated right upper lobe pulmonary nodule. 2. Unchanged, mildly FDG avid focus in the sigmoid colon. 3. Nonspecific new diffuse gastric hypermetabolism, possibly inflammatory. Please correlate clinically and with endoscopy, if indicated. 4. Mild FDG avidity associated with tracheostomy, nonspecific, probably physiologic. Please correlate clinically.  CT Chest 11/22/23:   IMPRESSION: Unchanged size of the previously radiated right apical nodule, although now with more rounded contours and increasing density. While findings may be secondary to postradiation evolution, recommend close interval follow-up to exclude the possibility of recurrent disease.  Nonspecific and unchanged nodular opacities in the medial right middle lobe and inferior lingula.  No new nodules. No adenopathy.       Interval History: Patient is tolerating treatment with Keytruda well with minimal side effects.  Reports stable ARGUELLES. Follows Dr. Chung. recently increased daily Prednisone to 10 mg. Patient does not notice much improvement. Patient declining home O2 Denies fever/chills, fatigue,rash, mouth sores, nausea, vomiting, diarrhea,constipation, abdominal pain, bleeding, easy bruising or visual changes, chest pain, cough, neuropathy, LE edema.   2/21/23 CT CAP: Right inguinal hernia  11/30/22 PET/CT IMPRESSION: Since prior FDG-PET/CT scan 6/21/2022: 1. Small RIGHT UPPER lobe nodule adjacent to groundglass is now indistinguishable from the revisualized groundglass changes in the RIGHT UPPER lobe. The lesion is otherwise unchanged in size. Remaining lung findings are unchanged. 2. Stable appearance of the head and neck. 3. No other suspicious findings noted.   [de-identified] : Continues on Keytruda Q3W. Due for Keytruda today, 2/19/25. Patient continues to tolerate the medication well.  On home O2 prn for SOB, ARGUELLES, followed by Dr. Chung    Patient following with Cardiology and Pulmonology.  Patient complains of decreased appetite and abdominal pain. He endorses some swelling to the left lateral neck as well.  Juanito has seen his GI team for his symptoms and for increased rectal activity on most recent PET on 11/12/24. He now has a colonoscopy sched for May 2025.   11/19/24 MRI Head: No acute infarct, hemorrhage, or mass effect. No evidence of metastasis.  11/12/24 PET: 1. Stable nonavid right upper lobe nodule and lingular opacity. No new concerning lung nodule/opacity. 2. Persistent intense focal activity within the rectum without interval low-dose CT changes.

## 2025-02-25 NOTE — PHYSICAL EXAM
[Normal] : grossly intact [de-identified] : Status post total laryngectomy [de-identified] : No evidence of recurrence

## 2025-03-04 NOTE — ASSESSMENT
[FreeTextEntry1] : Patient is a 73-year-old male with adenocarcinoma of the lung metastatic to the liver who was noted to have an elevated PSA of 7.34 ng/mL on 2/20/2025.  We had the discussion that life expectancy of at least 10 years would warrant any intervention for prostate cancer.  Plan: 1.  Multiparametric MRI of the prostate 2.   office follow-up after MRI

## 2025-03-04 NOTE — PHYSICAL EXAM
[General Appearance - Well Developed] : well developed [Normal Appearance] : normal appearance [General Appearance - In No Acute Distress] : no acute distress [] : no respiratory distress [Oriented To Time, Place, And Person] : oriented to person, place, and time [Affect] : the affect was normal [Mood] : the mood was normal

## 2025-03-04 NOTE — HISTORY OF PRESENT ILLNESS
[FreeTextEntry1] : The patient is a 73-year-old male presenting with concerns regarding an elevated prostate-specific antigen (PSA) level. He became aware of the elevation today, and the PSA has not been assessed previously. The patient does not report any history of recent urinary tract infections (UTIs), prostatitis, cystoscopy, episodes of urinary retention, or ejaculation on the day of or prior to the blood draw. Associated symptoms include nocturia, noted as waking up from sleep to urinate 0-1 times per night, and suprapubic pain. The patient has no history of prostate biopsies, a family history of prostate cancer, or previous rectal exams. Additionally, he reports that he does not take finasteride or dutasteride. Further evaluation and management may be warranted to understand the implications of the elevated PSA level and associated urinary symptoms.  2/20/2025-PSA-7.34 ng/mL PCPT estimated risk of biopsy detectable prostate cancer: 36% (14% High Grade Prostate Cancer)

## 2025-03-25 NOTE — END OF VISIT
[] : Resident [FreeTextEntry3] : Chest pain in the parking lot 8.10 with diaphoresis given sl ntg and asa will send to ER.  Patient has met lung cancer to liver so cant be sure symptoms not gi related but this is a medical emergency

## 2025-03-25 NOTE — ASSESSMENT
[FreeTextEntry1] : 73-year-old male with elevated PSA  Plan: 1.  Transperineal 12 core standard biopsy of the prostate 2.  PST 3.  Urine C&S 4.  Medical clearance

## 2025-03-25 NOTE — PHYSICAL EXAM
[General Appearance - Well Developed] : well developed [Normal Appearance] : normal appearance [] : no respiratory distress [Prostate Tenderness] : the prostate was not tender [No Prostate Nodules] : no prostate nodules [Prostate Size ___ gm] : prostate size [unfilled] gm [Skin Color & Pigmentation] : normal skin color and pigmentation [Oriented To Time, Place, And Person] : oriented to person, place, and time [Affect] : the affect was normal [Mood] : the mood was normal

## 2025-03-25 NOTE — HISTORY OF PRESENT ILLNESS
[FreeTextEntry1] : Patient here for 5 week follow up.  [de-identified] : Patient with chest pain that he reports as burning and ranks pain as 8/10. Patient reports pain radiates to the back and started about 1 hour ago in theparking lot. Patient is diaphoretic. Patient also having abdominal pain for 1 month. EKG shows no new ischemic changes, stable T wave inversions Patient given 4 tablets of 81mg aspirin and 1 sublingual nitroglycerin  EMS was notified and patient taken to ED.  mild improvement after nitro ingestion.

## 2025-03-25 NOTE — HISTORY OF PRESENT ILLNESS
[FreeTextEntry1] : The patient is a 73-year-old male presenting with concerns regarding an elevated prostate-specific antigen (PSA) level. He became aware of the elevation today, and the PSA has not been assessed previously. The patient does not report any history of recent urinary tract infections (UTIs), prostatitis, cystoscopy, episodes of urinary retention, or ejaculation on the day of or prior to the blood draw. Associated symptoms include nocturia, noted as waking up from sleep to urinate 0-1 times per night, and suprapubic pain. The patient has no history of prostate biopsies, a family history of prostate cancer, or previous rectal exams. Additionally, he reports that he does not take finasteride or dutasteride. Further evaluation and management may be warranted to understand the implications of the elevated PSA level and associated urinary symptoms.  2/20/2025-PSA-7.34 ng/mL PCPT estimated risk of biopsy detectable prostate cancer: 36% (14% High Grade Prostate Cancer)  MRI 3/18/2025 Multiparametric MRI of the prostate: 1.  No biopsy targetable [id number] 2 2.  Rectal lesion 3.  Patchy enhancement of the peripheral zone  Interval History: (3/25/2025) Patient returns today to review the results of the multiparametric MRI of the prostate.  He was informed of the findings.  He reports he has a gastroenterologist and will inform him of the rectal findings.  He reports that he is due for a colonoscopy.  He was assessed for prostatitis based on the patchy inflammation however patient had no findings to suggest prostatitis on exam.  We discussed standard 12 core transperineal biopsy of the prostate.

## 2025-03-25 NOTE — HEALTH RISK ASSESSMENT
[No] : No [No falls in past year] : Patient reported no falls in the past year [Little interest or pleasure doing things] : 1) Little interest or pleasure doing things [Feeling down, depressed, or hopeless] : 2) Feeling down, depressed, or hopeless [0] : 2) Feeling down, depressed, or hopeless: Not at all (0) [PHQ-2 Negative - No further assessment needed] : PHQ-2 Negative - No further assessment needed [[id number]] : 0 [Former] : Former [> 15 Years] : > 15 Years

## 2025-03-25 NOTE — ASSESSMENT
[FreeTextEntry1] : Patient with history of adenocarcinoma presenting with diarphorsis and chest pain that started 1 hour ago.  Patient blood pressure and vitals are stable at this time given 1 of nitroglycerin and 81 mg of aspirin.  nitroglycerin resulted in some pain.  EMS called and patient is to be taken to [facility] patient follows with Dr. Fields.    Patient also having abdominal pain that has been going on for 1 month, takes tramadol but does not improve pain.

## 2025-03-25 NOTE — PHYSICAL EXAM
[Normal Sclera/Conjunctiva] : normal sclera/conjunctiva [PERRL] : pupils equal round and reactive to light [EOMI] : extraocular movements intact [Normal Outer Ear/Nose] : the outer ears and nose were normal in appearance [Normal Oropharynx] : the oropharynx was normal [No JVD] : no jugular venous distention [No Lymphadenopathy] : no lymphadenopathy [Supple] : supple [Thyroid Normal, No Nodules] : the thyroid was normal and there were no nodules present [No Respiratory Distress] : no respiratory distress  [No Accessory Muscle Use] : no accessory muscle use [Clear to Auscultation] : lungs were clear to auscultation bilaterally [Normal Rate] : normal rate  [Regular Rhythm] : with a regular rhythm [Normal S1, S2] : normal S1 and S2 [No Murmur] : no murmur heard [No Carotid Bruits] : no carotid bruits [No Abdominal Bruit] : a ~M bruit was not heard ~T in the abdomen [No Varicosities] : no varicosities [Pedal Pulses Present] : the pedal pulses are present [No Edema] : there was no peripheral edema [No Palpable Aorta] : no palpable aorta [No Extremity Clubbing/Cyanosis] : no extremity clubbing/cyanosis [Soft] : abdomen soft [Non Tender] : non-tender [Non-distended] : non-distended [No Masses] : no abdominal mass palpated [No HSM] : no HSM [Normal Bowel Sounds] : normal bowel sounds [Normal Posterior Cervical Nodes] : no posterior cervical lymphadenopathy [Normal Anterior Cervical Nodes] : no anterior cervical lymphadenopathy [No CVA Tenderness] : no CVA  tenderness [No Spinal Tenderness] : no spinal tenderness [No Joint Swelling] : no joint swelling [Grossly Normal Strength/Tone] : grossly normal strength/tone [No Rash] : no rash [Coordination Grossly Intact] : coordination grossly intact [No Focal Deficits] : no focal deficits [Normal Gait] : normal gait [Deep Tendon Reflexes (DTR)] : deep tendon reflexes were 2+ and symmetric [Normal Affect] : the affect was normal [Normal Insight/Judgement] : insight and judgment were intact

## 2025-03-26 NOTE — QUALITY MEASURES
[Patient unable to perform] : patient is unable to perform spirometry
4 = No assist / stand by assistance

## 2025-03-26 NOTE — CONSULT LETTER
[Dear  ___] : Dear  [unfilled], [Consult Letter:] : I had the pleasure of evaluating your patient, [unfilled]. [Please see my note below.] : Please see my note below. [Consult Closing:] : Thank you very much for allowing me to participate in the care of this patient.  If you have any questions, please do not hesitate to contact me. [DrClaudia  ___] : Dr. LOJA [DrClaudia ___] : Dr. LOJA [[facility], [facility]] : [facility], [facility] [Director, Respiratory Care] : Director, Respiratory Care [[facility]] : [facility] [FreeTextEntry3] : Maximo Chung DO [facility]\par  Pulmonary Critical Care\par  Director Pulmonary Division\par  Medical Director Respiratory Therapy\par  [facility]\par  \par

## 2025-03-26 NOTE — DISCUSSION/SUMMARY
[FreeTextEntry1] : Severe COPD class IIID ,Distant  glottis RT with salvage laryngectomy for squamous cell Ca with  tracheostomy stoma/tube-post  SBRT 5400 cGy, 2/20 RUL ,  Remains on Keytruda CT scan 2/24 stable- , CT scan 2/25 increased liver mets Currently near  baseline, chronic dyspnea and tracheal secretions, but chronic dyspepsia Continue  Stiolto daily, no benefit of nebs, trial of extra dose pm, ( notes benefit but states it wains) Ceftin and sputum c/s, prn prednisone TRial of new PDE3-4 inhibitor, Ohtuvarye for COPD Cardiology following Compliance with 02 stressed, nocturnal and ex variable Am cortisol, not on prednisone, will discuss with Oncology MRI prostate negative, will discuu bx with urology 2 months  or sooner if needed, prognosis guarded No pulmonary contraindications to GI procedure in hospital setting, increased risk of post operative pulmonary complications, risk/benefit favors Duo neb q 4-6 hr prn monitored bed, continuous sp02

## 2025-03-26 NOTE — PHYSICAL EXAM
[General Appearance - Well Developed] : well developed [Normal Appearance] : normal appearance [Well Groomed] : well groomed [General Appearance - Well Nourished] : well nourished [No Deformities] : no deformities [Normal Conjunctiva] : the conjunctiva exhibited no abnormalities [Heart Rate And Rhythm] : heart rate and rhythm were normal [Heart Sounds] : normal S1 and S2 [Murmurs] : no murmurs present [Respiration, Rhythm And Depth] : normal respiratory rhythm and effort [Exaggerated Use Of Accessory Muscles For Inspiration] : no accessory muscle use [Abnormal Walk] : normal gait [] : no rash [No Focal Deficits] : no focal deficits [Oriented To Time, Place, And Person] : oriented to person, place, and time [Impaired Insight] : insight and judgment were intact [Affect] : the affect was normal [Memory Recent] : recent memory was not impaired [Memory Remote] : remote memory was not impaired [Nail Clubbing] : no clubbing of the fingernails [Cyanosis, Localized] : no localized cyanosis [FreeTextEntry1] : no edema

## 2025-03-26 NOTE — HISTORY OF PRESENT ILLNESS
[Former] : former [>= 20 pack years] : >= 20 pack years [FreeTextEntry1] : no fever, chills, chest pain chronic exertional dyspnea at baseline Stiolto daily, helps Dyspnea chronic no benefit of nebulizer still working, considering [facility] using 02 intermittently Remains on Pembro q 3 weeks, Oncology input noted Recent CT abd with incr liver nodules bothered by abdominal pain- saw GI, also saw Urology    [YearQuit] : 2013

## 2025-04-07 NOTE — PHYSICAL EXAM
[Alert] : alert [No Respiratory Distress] : no respiratory distress [No Accessory Muscle Use] : no accessory muscle use [Clear to Auscultation] : lungs were clear to auscultation bilaterally [Normal S1, S2] : normal S1 and S2 [de-identified] : Trach in place

## 2025-04-07 NOTE — HISTORY OF PRESENT ILLNESS
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 1 and 18.4 most recent   Denied excessive fatigue, nausea, vomiting, or excessive sleepiness, still working   History of steroid use: none on (tiotropium bromide and olodaterol)

## 2025-04-07 NOTE — ASSESSMENT
[FreeTextEntry1] : Variable low cortisol with normal most recent in 4/2/2025 Lowest was 1.4, most recent was 18.4 On Ketruda that could cause primary or central AI through hypophysitis   No symptoms or signs of AI, counseled extensively if any symptoms to go the hospital immediately  Check am cortisol and ACTH and we may consider ACTH stimulation if numbers are borderline  In case of AI symptoms, go to the hospital immediately  On stioloto inhalar which is tiotropiom   High TSH likely subclinical hypothyroidism  Recheck TFT     I spent  minutes discussing with patient face to face and non-face to face reviewing documentations, labs, and/or imaging, also discussing the management plans.  RTC in 3-4 months for 20 min

## 2025-04-10 NOTE — DISCUSSION/SUMMARY
[FreeTextEntry1] : Severe COPD class IIID ,Distant  glottis RT with salvage laryngectomy for squamous cell Ca with  tracheostomy stoma/tube-post  SBRT 5400 cGy, 2/20 RUL ,  Remains on Keytruda CT scan 2/24 stable- , CT/PET 11/24 DAFNE, Hospitalization reviewed, new RLL density, RML seen prior ( PET neg),  has oncology f/u ? repeat PET scan Currently at baseline, chronic dyspnea and tracheal secretions Continue  Stiolto daily, no benefit of nebs, trial of extra dose pm, ( notes benefit but states it wains) Azithromycin TIW to decrease secretions, exacerbations- is helping Cardiology following Prior PE w/u negative Discussed pulmonary Rehab, he does not want  Compliance with 02 stressed, nocturnal and ex Trial of new PDE 3-4 inhibitor fro COPD 3 months  or sooner if needed

## 2025-04-10 NOTE — HISTORY OF PRESENT ILLNESS
[Former] : former [>= 20 pack years] : >= 20 pack years [FreeTextEntry1] : Hospital Course: Discharge Date 31-Mar-2025 Admission Date 28-Mar-2025 21:37 Reason for Admission Intractable abdominal pain due to acute duodenal diverticulitis +/- PUD Hospital Course  Hospital Course HPI: 73yoM hx laryngeal cancer (2012), s/p laryngectomy, tonsillectomy, prior tracheostomy now w/ stoma and voice prosthesis, lung cancer (2020) with metastasis to liver, presenting with abdominal pain. Pt reports several days of sharp, generalized abdominal pain without vomiting, changes in bowel habits or episodes of GI related bleeding.  He was seen in [facility] ED 3 days ago for atypical chest pain and discharged home and reports that he had abdominal pain at the time, but it had not reached its current level of severity.  He is prescribed tramadol and dicyclomine as needed which he has been using without relief.  CT A/P with findings concerning for acute duodenal diverticulitis or peptic ulcer disease.  (28 Mar 2025 23:18) You were admitted for abdominal pain and found to have acute duodenal diverticulitis with possible peptic ulcer disease. You were treated with IV antibiotics, IV PPI and pain control.  Your symptoms improved but you will need to continue to take antibiotics and an acid suppressor. You will need to follow up with your primary care physician as an outpatient. You will also need to follow up with the gastroenterologist as an outpatient or an endoscopy. Discharging Provider:  Enrico Leal MD Contact Info: [phone] - Please call with any questions or concerns.        no fever, chills, chest pain Abdominal pain much improved on abx chronic exertional dyspnea at baseline Stiolto daily, helps Dyspnea chronic no benefit of nebulizer Did not obtain Ohtuvayre using 02 intermittently Remains on Pembro q 3 weeks, Oncology input noted Recent PET scan Dr Willson    [YearQuit] : 2013

## 2025-04-10 NOTE — HEALTH RISK ASSESSMENT
[No] : No [No falls in past year] : Patient reported no falls in the past year [Little interest or pleasure doing things] : 1) Little interest or pleasure doing things [Feeling down, depressed, or hopeless] : 2) Feeling down, depressed, or hopeless [0] : 2) Feeling down, depressed, or hopeless: Not at all (0) [PHQ-2 Negative - No further assessment needed] : PHQ-2 Negative - No further assessment needed [Former] : Former

## 2025-04-10 NOTE — HISTORY OF PRESENT ILLNESS
[Post-hospitalization from ___ Hospital] : Post-hospitalization from [unfilled] Hospital [Admitted on: ___] : The patient was admitted on [unfilled] [Discharged on ___] : discharged on [unfilled] [FreeTextEntry2] : patient was in ER with abd pain  ruled out for MI but diagnosed with diverticuliits of duodenum has been well post discharge prostate biopsy planned chemo q 3 weeks for lung to liver mets

## 2025-04-10 NOTE — CONSULT LETTER
[Dear  ___] : Dear  [unfilled], [Consult Letter:] : I had the pleasure of evaluating your patient, [unfilled]. [Please see my note below.] : Please see my note below. [Consult Closing:] : Thank you very much for allowing me to participate in the care of this patient.  If you have any questions, please do not hesitate to contact me. [DrClaudia  ___] : Dr. LOJA [DrClaudia ___] : Dr. LOJA [[facility], [facility]] : [facility] DO, [facility] [Director, Respiratory Care] : Director, Respiratory Care [[facility]] : [facility] [FreeTextEntry3] : [facility] DO [facility]\par  Pulmonary Critical Care\par  Director Pulmonary Division\par  Medical Director Respiratory Therapy\par  [facility]\par  \par

## 2025-04-10 NOTE — ASSESSMENT
[FreeTextEntry1] : duodenal diverticulitis rsolved completed abx see gi for colonscopy tsh elevated start tsh meds adrenal insuf seeing endocrine bp stable follow up 3months

## 2025-04-14 NOTE — HISTORY OF PRESENT ILLNESS
[FreeTextEntry1] : 73 year-old  M with PMH of COPD, HTN cancer of the larynx, lung cancer, liver metastasis with excellent response to ChemoTx, being seen for Elevated PSA    Problem List: 1. Elevated PSA   2/20/2025-PSA-7.34 ng/mL PCPT estimated risk of biopsy detectable prostate cancer: 36% (14% High Grade Prostate Cancer)  MRI 3/18/2025 Multiparametric MRI of the prostate: 1.  No biopsy targetable [id number] 2 2.  Rectal lesion 3.  Patchy enhancement of the peripheral zone  Interval History: (04/14/2025) Patient had multiple hospital admissions since last being seen.  He has not undergone presurgical testing as of yet.  He would like to postpone prostate biopsy.  We discussed having the procedure scheduled for roughly 1 month from now.  He would like to retry having it scheduled around that time.

## 2025-04-14 NOTE — ASSESSMENT
[FreeTextEntry1] : 73 year-old M with PMH of COPD, HTN cancer of the larynx, lung cancer, liver metastasis with excellent response to ChemoTx, being seen for Elevated PSA   Problem List: 1. Elevated PSA  2/20/2025-PSA-7.34 ng/mL PCPT estimated risk of biopsy detectable prostate cancer: 36% (14% High Grade Prostate Cancer)  MRI 3/18/2025 Multiparametric MRI of the prostate: 1. No biopsy targetable [id number] 2 2. Rectal lesion 3. Patchy enhancement of the peripheral zone  Plan: 1.  Reschedule transperineal prostate biopsy for 1 month from now 2.  PST    This telephonic visit was provided via audio only technology. The patient was located at home at the time of the visit. The provider, Fran Gr Jr., MD was located at the medical office located in [place], [place] at the time of the visit. The patient and Provider participated in the telephonic visit.

## 2025-04-22 NOTE — PHYSICAL EXAM
[Alert] : alert [Normal Voice/Communication] : normal voice/communication [Healthy Appearing] : healthy appearing [No Acute Distress] : no acute distress [Well Developed] : well developed [Well Nourished] : well nourished [Sclera] : the sclera and conjunctiva were normal [Hearing Threshold Finger Rub Not Walker] : hearing was normal [Normal Lips/Gums] : the lips and gums were normal [Oropharynx] : the oropharynx was normal [Normal Appearance] : the appearance of the neck was normal [No Neck Mass] : no neck mass was observed [No Respiratory Distress] : no respiratory distress [No Acc Muscle Use] : no accessory muscle use [Respiration, Rhythm And Depth] : normal respiratory rhythm and effort [Auscultation Breath Sounds / Voice Sounds] : lungs were clear to auscultation bilaterally [Heart Rate And Rhythm] : heart rate was normal and rhythm regular [Normal S1, S2] : normal S1 and S2 [Murmurs] : no murmurs [Bowel Sounds] : normal bowel sounds [Abdomen Tenderness] : non-tender [No Masses] : no abdominal mass palpated [Abdomen Soft] : soft [] : no hepatosplenomegaly [No CVA Tenderness] : no CVA  tenderness [Abnormal Walk] : normal gait [No Joint Swelling] : no joint swelling seen [Normal Color / Pigmentation] : normal skin color and pigmentation [Oriented To Time, Place, And Person] : oriented to person, place, and time [de-identified] : Patient has chronic tracheostomy with Ventimask oxygen applied to the tracheostomy.  He came in with an oxygen tank. [de-identified] : Deferred until colonoscopy.

## 2025-04-22 NOTE — ASSESSMENT
[FreeTextEntry1] : Impression: Chronic diarrhea in a patient who has been on oral antibiotics and IV antibiotics in the hospital for duodenal diverticulitis rule out possible C. difficile colitis.  Abnormal PET CT scan of the colon showing increased uptake in the rectum rule out possible rectal neoplasm.  Recommendations: At the patient's request we will cancel his upcoming colonoscopy for May 1.  This will be rescheduled for sometime in September of this year.  Patient was told to stop his current antibiotic therapies and will be sent for stool studies including C. difficile PCR and GI PCR as well as BMP and CBC with differential.  He will return to me as needed until the above colonoscopy has been rescheduled in September of this year.  The patient was accompanied by his wife today and both the patient and his wife appeared to understand all of the above instructions, information, and management plan.  I reviewed his CT images from his last hospitalization/[facility] in the end of March of this year and agree with the radiologist interpretation.

## 2025-04-28 NOTE — HISTORY OF PRESENT ILLNESS
[Post-hospitalization from ___ Hospital] : Post-hospitalization from [unfilled] Hospital [Admitted on: ___] : The patient was admitted on [unfilled] [Discharged on ___] : discharged on [unfilled] [FreeTextEntry1] : recently at ED for hyperkalemia, labs repeat and patient discharged.  epigastric pain [de-identified] : Patient here for follow up  has been epigastric pain, had recent CT showing duodenal diverticulitis did not finish course of antibiotics as they mad him extremely week patient states pain is better after eating and rates it 8/10 Patient with weight loss stating lack of apetits

## 2025-04-28 NOTE — HEALTH RISK ASSESSMENT
[No] : No [PHQ-2 Negative - No further assessment needed] : PHQ-2 Negative - No further assessment needed [[id number]] : 2

## 2025-04-28 NOTE — HISTORY OF PRESENT ILLNESS
[Post-hospitalization from ___ Hospital] : Post-hospitalization from [unfilled] Hospital [Admitted on: ___] : The patient was admitted on [unfilled] [Discharged on ___] : discharged on [unfilled] [FreeTextEntry1] : recently at ED for hyperkalemia, labs repeat and patient discharged.  epigastric pain [de-identified] : Patient here for follow up  has been epigastric pain, had recent CT showing duodenal diverticulitis did not finish course of antibiotics as they mad him extremely week patient states pain is better after eating and rates it 8/10 Patient with weight loss stating lack of apetits

## 2025-04-28 NOTE — ASSESSMENT
[FreeTextEntry1] : Juanito is 73 year old male with stage 4 adenocarcinoma of the lung with mets to liver  recent hospitalization showing duodenal diverticulitis did not complete antibiotics  Epigastric pain labs collected to see if patient is still having infection will try pantoprazole 20mg to see if improves told to return to GI as patient needs endoscopy, initially pushed it off but advised to get it done sooner.  Rectal mass also found on CT needs colonoscopy as well.  Patient with no signs of active infection no fever and chills  weight loss likely in setting of cancer rather than infection will evaluate for elevated WBC on CBC (has not had CBC recently)  weight loss likely 2/2 cancer and Keytruda proper nutrition advised  Acquired hypothyroid cold intolerance recommended to start meds and have them in morning  Hernia non incarcerated no pain currently patient is retiring and would like to wait until after to see if surgical intervention is necessary pain in area in the past but not currently  elevated PSA to get prostate biopsy with Dr. Gr  Elevated creatinine likely CKD vs RONEY with malnutrition proper nutrition recommended labs obtained to evaluate creatanine

## 2025-04-28 NOTE — HEALTH RISK ASSESSMENT
[No] : No [PHQ-2 Negative - No further assessment needed] : PHQ-2 Negative - No further assessment needed [[id number]] : 2

## 2025-06-04 NOTE — PHYSICAL EXAM
[Normal] : grossly intact [de-identified] : Status post total laryngectomy [de-identified] : No evidence of recurrence

## 2025-06-04 NOTE — HISTORY OF PRESENT ILLNESS
[de-identified] : Mr Montemayor is a 73 year old male with stage 4 adenoCa lung. . He completed SBRT 5400 cGy in 3 fractions on 2/18/2020. 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 The lung cancer treated with SBRT did not have a biopsy performed due to concerns regarding underlying COPD and tolerance of needle biopsy.  PET at that time: 1. Spiculated right upper lobe nodule considerably more solid compared to PET/CT of August 28, 2013 where it was a a groundglass nodular opacity, suspicious for malignancy  PET/CT 11/9/2020 - near complete resolution of FDG avid hepatic lesions. Tiny focus of FDG avidity in the right lobe in the location of a previously identified large necrotic new lesion probably represents a small area of residual disease -Resolved intensely FDG avid subcutaneous nodule right upper arm -Unchanged size and minimal FDG avidity od spiculated right upper lobe pulmonary nodule. Small focus sigmoid colon possibly inflamed diverticulum or polyp.  PET/CT 6/5/2020 -Multiple new FDG avid bilobar hepatic lesions, suspicious for metastatic disease -Intensely FDG avid right upper arm subcutaneous nodule, difficult to delineate, suspicious for metastasis. -Decreased size and FDG avidity of spiculated right upper lobe pulmonary nodule  Liver biopsy confirmed metastatic adenocarcinoma of pulmonary origin.     Following 3 cycles of Carbo/Alimta/Keytruda. PET/CT showed great response. We stopped Carboplatin and continued with Pemetrexed and Keytruda, as triple therapy had caused severe fatigue.. Patient still admited fatigue but it was slightly better since Carbo was stopped. Patient states appetite is okay. Patient c/o RLQ discomfort. PET/CT shows possible right inguinal hernia, unsure if pain is related. Denies fever/chills, nausea, vomiting, diarrhea,constipation, bleeding, easy bruising or visual changes, chest pain, SOB or ARGUELLES, LE edema.     Fatigue has largely resolved. We are now following on single agent Keytruda.       Interval History: PET/CT 1/26/21:  IMPRESSION: Compared to FDG-PET/CT scan 10/14/2020:  1. Resolution of tiny FDG avid focus in the right hepatic lobe.  2. Minimally FDG avid subcentimeter spiculated right upper lobe pulmonary nodule, unchanged.  3. Decreased intensity of FDG avid focus in the sigmoid colon.       PET/CT June 21, 2021:  IMPRESSION: Compared to FDG-PET/CT scan 1/26/2021:  1. Unchanged minimally FDG avid subcentimeter spiculated right upper lobe pulmonary nodule. 2. Unchanged, mildly FDG avid focus in the sigmoid colon. 3. Nonspecific new diffuse gastric hypermetabolism, possibly inflammatory. Please correlate clinically and with endoscopy, if indicated. 4. Mild FDG avidity associated with tracheostomy, nonspecific, probably physiologic. Please correlate clinically.  CT Chest 11/22/23:   IMPRESSION: Unchanged size of the previously radiated right apical nodule, although now with more rounded contours and increasing density. While findings may be secondary to postradiation evolution, recommend close interval follow-up to exclude the possibility of recurrent disease.  Nonspecific and unchanged nodular opacities in the medial right middle lobe and inferior lingula.  No new nodules. No adenopathy.       Interval History: Patient is tolerating treatment with Keytruda well with minimal side effects.  Reports stable ARGUELLES. Follows Dr. Chung. recently increased daily Prednisone to 10 mg. Patient does not notice much improvement. Patient declining home O2 Denies fever/chills, fatigue,rash, mouth sores, nausea, vomiting, diarrhea,constipation, abdominal pain, bleeding, easy bruising or visual changes, chest pain, cough, neuropathy, LE edema.   2/21/23 CT CAP: Right inguinal hernia  11/30/22 PET/CT IMPRESSION: Since prior FDG-PET/CT scan 6/21/2022: 1. Small RIGHT UPPER lobe nodule adjacent to groundglass is now indistinguishable from the revisualized groundglass changes in the RIGHT UPPER lobe. The lesion is otherwise unchanged in size. Remaining lung findings are unchanged. 2. Stable appearance of the head and neck. 3. No other suspicious findings noted.   [de-identified] : Continues on Keytruda Q3W. Received Keytruda today, 6/04/25, tolerating the medication well.  On home O2 prn for SOB, ARGUELLES, followed by Dr. Chung    Patient states his weight has remained stable since his previous visit and energy levels are normal.  He offers no acute complaints.  5/14/25: Ferritin 11 ng/mL  6/04/25: HGB 10.5 g, HCT 34.5 %  2/26/25 CT Neck, Abdomen and Pelvis: - Status post laryngectomy with reconstruction. No evidence for local recurrent disease. - No cervical adenopathy by imaging size criteria. However, a left paratracheal upper mediastinal node has increased in size, which could be related to reactive changes versus metastasis. - Right upper lobe spiculated lung mass unchanged in size since prior PET/CT on 11/12/2024. - Since 2/21/2023, overall mild interval increase in size and multiplicity of the innumerable hepatic hypodensities in keeping with known metastatic disease.

## 2025-06-04 NOTE — ASSESSMENT
[FreeTextEntry1] : 73-year-old male with adenocarcinoma lung metastatic to liver, with an excellent response to carboplatin/pemetrexed/Keytruda. Now doing well on single agent immunotherapy utilizing Keytruda. Currently DAFNE.  Plan - Continue immunotherapy with Keytruda as a single agent. Next infusion due, 6/25/25  - We discussed circulating tumor DNA determination prior to potentially holding Pembro and monitoring off therapy. He would like to think about these options and let us know his decision.  - Advised pt to take PO iron supplementation once a day with a meal  RTO in 3 months

## 2025-06-04 NOTE — HISTORY OF PRESENT ILLNESS
[de-identified] : Mr Montemayor is a 73 year old male with stage 4 adenoCa lung. . He completed SBRT 5400 cGy in 3 fractions on 2/18/2020. 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 The lung cancer treated with SBRT did not have a biopsy performed due to concerns regarding underlying COPD and tolerance of needle biopsy.  PET at that time: 1. Spiculated right upper lobe nodule considerably more solid compared to PET/CT of August 28, 2013 where it was a a groundglass nodular opacity, suspicious for malignancy  PET/CT 11/9/2020 - near complete resolution of FDG avid hepatic lesions. Tiny focus of FDG avidity in the right lobe in the location of a previously identified large necrotic new lesion probably represents a small area of residual disease -Resolved intensely FDG avid subcutaneous nodule right upper arm -Unchanged size and minimal FDG avidity od spiculated right upper lobe pulmonary nodule. Small focus sigmoid colon possibly inflamed diverticulum or polyp.  PET/CT 6/5/2020 -Multiple new FDG avid bilobar hepatic lesions, suspicious for metastatic disease -Intensely FDG avid right upper arm subcutaneous nodule, difficult to delineate, suspicious for metastasis. -Decreased size and FDG avidity of spiculated right upper lobe pulmonary nodule  Liver biopsy confirmed metastatic adenocarcinoma of pulmonary origin.     Following 3 cycles of Carbo/Alimta/Keytruda. PET/CT showed great response. We stopped Carboplatin and continued with Pemetrexed and Keytruda, as triple therapy had caused severe fatigue.. Patient still admited fatigue but it was slightly better since Carbo was stopped. Patient states appetite is okay. Patient c/o RLQ discomfort. PET/CT shows possible right inguinal hernia, unsure if pain is related. Denies fever/chills, nausea, vomiting, diarrhea,constipation, bleeding, easy bruising or visual changes, chest pain, SOB or ARGUELLES, LE edema.     Fatigue has largely resolved. We are now following on single agent Keytruda.       Interval History: PET/CT 1/26/21:  IMPRESSION: Compared to FDG-PET/CT scan 10/14/2020:  1. Resolution of tiny FDG avid focus in the right hepatic lobe.  2. Minimally FDG avid subcentimeter spiculated right upper lobe pulmonary nodule, unchanged.  3. Decreased intensity of FDG avid focus in the sigmoid colon.       PET/CT June 21, 2021:  IMPRESSION: Compared to FDG-PET/CT scan 1/26/2021:  1. Unchanged minimally FDG avid subcentimeter spiculated right upper lobe pulmonary nodule. 2. Unchanged, mildly FDG avid focus in the sigmoid colon. 3. Nonspecific new diffuse gastric hypermetabolism, possibly inflammatory. Please correlate clinically and with endoscopy, if indicated. 4. Mild FDG avidity associated with tracheostomy, nonspecific, probably physiologic. Please correlate clinically.  CT Chest 11/22/23:   IMPRESSION: Unchanged size of the previously radiated right apical nodule, although now with more rounded contours and increasing density. While findings may be secondary to postradiation evolution, recommend close interval follow-up to exclude the possibility of recurrent disease.  Nonspecific and unchanged nodular opacities in the medial right middle lobe and inferior lingula.  No new nodules. No adenopathy.       Interval History: Patient is tolerating treatment with Keytruda well with minimal side effects.  Reports stable ARGUELLES. Follows Dr. Chung. recently increased daily Prednisone to 10 mg. Patient does not notice much improvement. Patient declining home O2 Denies fever/chills, fatigue,rash, mouth sores, nausea, vomiting, diarrhea,constipation, abdominal pain, bleeding, easy bruising or visual changes, chest pain, cough, neuropathy, LE edema.   2/21/23 CT CAP: Right inguinal hernia  11/30/22 PET/CT IMPRESSION: Since prior FDG-PET/CT scan 6/21/2022: 1. Small RIGHT UPPER lobe nodule adjacent to groundglass is now indistinguishable from the revisualized groundglass changes in the RIGHT UPPER lobe. The lesion is otherwise unchanged in size. Remaining lung findings are unchanged. 2. Stable appearance of the head and neck. 3. No other suspicious findings noted.   [de-identified] : Continues on Keytruda Q3W. Received Keytruda today, 6/04/25, tolerating the medication well.  On home O2 prn for SOB, ARGUELLES, followed by Dr. Chung    Patient states his weight has remained stable since his previous visit and energy levels are normal.  He offers no acute complaints.  5/14/25: Ferritin 11 ng/mL  6/04/25: HGB 10.5 g, HCT 34.5 %  2/26/25 CT Neck, Abdomen and Pelvis: - Status post laryngectomy with reconstruction. No evidence for local recurrent disease. - No cervical adenopathy by imaging size criteria. However, a left paratracheal upper mediastinal node has increased in size, which could be related to reactive changes versus metastasis. - Right upper lobe spiculated lung mass unchanged in size since prior PET/CT on 11/12/2024. - Since 2/21/2023, overall mild interval increase in size and multiplicity of the innumerable hepatic hypodensities in keeping with known metastatic disease.

## 2025-06-04 NOTE — ADDENDUM
[FreeTextEntry1] : Documented by Jin Silva acting as scribe for Dr. Willson on  06/04/2025.   All Medical record entries made by the Scribe were at my, Dr. Willson's, direction and personally dictated by me on 06/04/2025. I have reviewed the chart and agree that the record accurately reflects my personal performance of the history, physical exam, assessment and plan. I have also personally directed, reviewed, and agreed with the discharge instructions.

## 2025-06-04 NOTE — PHYSICAL EXAM
[Normal] : grossly intact [de-identified] : Status post total laryngectomy [de-identified] : No evidence of recurrence

## 2025-06-13 NOTE — ASSESSMENT
[Patient Optimized for Surgery] : Patient optimized for surgery [No Further Testing Recommended] : no further testing recommended [FreeTextEntry4] : Patient with history of copd, lung cancer with liver mets prior laryngeal cancer. He is in stable health and may proceed with low risk surgery

## 2025-06-13 NOTE — HISTORY OF PRESENT ILLNESS
[Aortic Stenosis] : no aortic stenosis [Atrial Fibrillation] : no atrial fibrillation [Coronary Artery Disease] : no coronary artery disease [Recent Myocardial Infarction] : no recent myocardial infarction [Implantable Device/Pacemaker] : no implantable device/pacemaker [Asthma] : no asthma [COPD] : COPD [Sleep Apnea] : no sleep apnea [Smoker] : not a smoker [No Adverse Anesthesia Reaction] : no adverse anesthesia reaction in self or family member [Family Member] : no family member with adverse anesthesia reaction/sudden death [Self] : no previous adverse anesthesia reaction [Chronic Anticoagulation] : no chronic anticoagulation [Chronic Kidney Disease] : no chronic kidney disease [Diabetes] : no diabetes [(Patient denies any chest pain, claudication, dyspnea on exertion, orthopnea, palpitations or syncope)] : Patient denies any chest pain, claudication, dyspnea on exertion, orthopnea, palpitations or syncope [FreeTextEntry1] : Right eye floaters [FreeTextEntry2] : 6/20/25 [FreeTextEntry3] : Dr Otero [FreeTextEntry4] : Patient with history of adenocarcinoma of lung mets to the liver, prior laryngeal cancer, history of htn,, copd

## 2025-07-15 NOTE — HISTORY OF PRESENT ILLNESS
[FreeTextEntry1] : follow up lung cancer [de-identified] : history of lung cancer mets to lung and liver on keytruda had recent eye surgery for cataracts and floaters has been having neck pain an spasm is pending a prostate biopsy for elevated psa

## 2025-07-15 NOTE — ASSESSMENT
[FreeTextEntry1] : neck spsam flexeril prn copd stable use nebs and inhaler lung cancer with mets follow up heme onc,  bp stable elevated psa biospy planned if positive only treatment would be radiation and oral agents

## 2025-07-23 NOTE — DISCUSSION/SUMMARY
[FreeTextEntry1] : Severe COPD class IIID ,Distant  glottis RT with salvage laryngectomy for squamous cell Ca with  tracheostomy stoma/tube- Metastatic adenocarcinoma Lung- Liver, post SBRT RUL, progression suspect on recent scans 3/25[facility] discolored tracheal secretions/dyspnea Continue  Stiolto daily, nebs Trial of Ohtuvarye neb bid Treat as exacerbation pred taper/abx CXR Azithromycin TIW to decrease secretions, exacerbations- is helping Cardiology following Prior PE w/u negative Discussed pulmonary Rehab, he does not want  Compliance with 02 stressed, nocturnal and ex Does not GI w/u understands risks /benefits 3 months  or sooner if needed, prognosis guarded

## 2025-07-23 NOTE — CONSULT LETTER
[Dear  ___] : Dear  [unfilled], [Consult Letter:] : I had the pleasure of evaluating your patient, [unfilled]. [Please see my note below.] : Please see my note below. [Consult Closing:] : Thank you very much for allowing me to participate in the care of this patient.  If you have any questions, please do not hesitate to contact me. [DrClaudia  ___] : Dr. LOJA [DrClaudia ___] : Dr. LOJA [Maximo Chung DO, [facility]] : Maximo Chung DO, [facility] [Director, Respiratory Care] : Director, Respiratory Care [[facility]] : [facility] [FreeTextEntry3] : Maximo [facility] DO [facility]\par  Pulmonary Critical Care\par  Director Pulmonary Division\par  Medical Director Respiratory Therapy\par  [facility]\par  \par

## 2025-07-23 NOTE — HISTORY OF PRESENT ILLNESS
[Former] : former [>= 20 pack years] : >= 20 pack years [FreeTextEntry1] : no fever, chills, chest pain worsening tracheal secretion, thicker chronic exertional dyspnea worse than baseline Stiolto daily, helps  nebulizer Did not obtain Ohtuvayre using 02 intermittently Remains on Pembro q 3 weeks, Oncology input noted Recent CT  scan Dr Willson, new lung nodules RLL and slight increase in liver hypodensities    [YearQuit] : 2013